# Patient Record
Sex: MALE | Race: WHITE | NOT HISPANIC OR LATINO
[De-identification: names, ages, dates, MRNs, and addresses within clinical notes are randomized per-mention and may not be internally consistent; named-entity substitution may affect disease eponyms.]

---

## 2024-05-23 ENCOUNTER — APPOINTMENT (OUTPATIENT)
Dept: PLASTIC SURGERY | Facility: CLINIC | Age: 64
End: 2024-05-23
Payer: COMMERCIAL

## 2024-05-23 VITALS
SYSTOLIC BLOOD PRESSURE: 146 MMHG | OXYGEN SATURATION: 99 % | RESPIRATION RATE: 22 BRPM | HEART RATE: 80 BPM | DIASTOLIC BLOOD PRESSURE: 102 MMHG

## 2024-05-23 DIAGNOSIS — T07.XXXA UNSPECIFIED MULTIPLE INJURIES, INITIAL ENCOUNTER: ICD-10-CM

## 2024-05-23 DIAGNOSIS — S01.81XA LACERATION W/OUT FOREIGN BODY OF OTHER PART OF HEAD, INITIAL ENCOUNTER: ICD-10-CM

## 2024-05-23 PROBLEM — Z00.00 ENCOUNTER FOR PREVENTIVE HEALTH EXAMINATION: Status: ACTIVE | Noted: 2024-05-23

## 2024-05-23 PROCEDURE — 12013 RPR F/E/E/N/L/M 2.6-5.0 CM: CPT

## 2024-05-23 NOTE — REASON FOR VISIT
[Procedure: _________] : a [unfilled] procedure visit [FreeTextEntry1] : 64-year-old man status post fall with forehead laceration  (seen in ER, CT scan< no other injury)

## 2024-05-23 NOTE — PROCEDURE
[FreeTextEntry1] : Laceration forehead and multiple facial abrasions  [FreeTextEntry2] : Repair forehead laceration and treatment of multiple facial abrasions [FreeTextEntry3] : Xylocaine with epinephrine  [FreeTextEntry4] : 10 cc [FreeTextEntry5] : none  [FreeTextEntry6] : Following a sterile prep and drape, Xylocaine with epinephrine was injected for local anesthesia.  Wound was cleaned and debrided, and the laceration repaired in deep and superficial layers.  Sterile dressing was placed, patient tolerated well.   [FreeTextEntry7] : none

## 2024-05-23 NOTE — ASSESSMENT
[FreeTextEntry1] : A: Forehead laceration with multiple facial abrasions P: Wounds washed irrigated and forehead laceration closed in layers.   Tolerated well, instructions reviewed.

## 2024-05-31 ENCOUNTER — APPOINTMENT (OUTPATIENT)
Dept: PLASTIC SURGERY | Facility: CLINIC | Age: 64
End: 2024-05-31
Payer: COMMERCIAL

## 2024-05-31 VITALS
HEART RATE: 65 BPM | DIASTOLIC BLOOD PRESSURE: 98 MMHG | TEMPERATURE: 97.9 F | SYSTOLIC BLOOD PRESSURE: 160 MMHG | OXYGEN SATURATION: 100 %

## 2024-05-31 PROCEDURE — 99024 POSTOP FOLLOW-UP VISIT: CPT

## 2024-05-31 RX ORDER — LOSARTAN POTASSIUM AND HYDROCHLOROTHIAZIDE 50; 12.5 MG/1; MG/1
50-12.5 TABLET, FILM COATED ORAL
Refills: 0 | Status: ACTIVE | COMMUNITY

## 2024-05-31 NOTE — REASON FOR VISIT
[Follow-Up: _____] : a [unfilled] follow-up visit [FreeTextEntry1] : Mr. ROBERTO CARLOS BRODERICK  returns for suture removal, generally doing well with no complaints and no fevers or chills at home.

## 2024-05-31 NOTE — PHYSICAL EXAM
[NI] : Normal [de-identified] : edema resolving abrasion present, improved wound is cleana dn well approximated with no erythema or purulence

## 2024-06-06 ENCOUNTER — APPOINTMENT (OUTPATIENT)
Dept: PLASTIC SURGERY | Facility: CLINIC | Age: 64
End: 2024-06-06
Payer: COMMERCIAL

## 2024-06-06 VITALS
TEMPERATURE: 97.9 F | RESPIRATION RATE: 20 BRPM | HEART RATE: 76 BPM | OXYGEN SATURATION: 97 % | SYSTOLIC BLOOD PRESSURE: 135 MMHG | DIASTOLIC BLOOD PRESSURE: 89 MMHG

## 2024-06-06 PROCEDURE — 99024 POSTOP FOLLOW-UP VISIT: CPT

## 2024-06-06 NOTE — REASON FOR VISIT
[Follow-Up: _____] : a [unfilled] follow-up visit [FreeTextEntry1] : Mr. ROBERTO CARLOS BRODERICK returns for a follow up visit, generally doing well with no complaints and no fevers or chills at home, improvement noted

## 2024-06-06 NOTE — PHYSICAL EXAM
[NI] : Normal [de-identified] : forehead laceration healing well, abrasion still with open area [de-identified] : nasal abrasion heling well as is the lip

## 2024-06-20 ENCOUNTER — APPOINTMENT (OUTPATIENT)
Dept: PLASTIC SURGERY | Facility: CLINIC | Age: 64
End: 2024-06-20
Payer: COMMERCIAL

## 2024-06-20 VITALS — HEART RATE: 64 BPM | DIASTOLIC BLOOD PRESSURE: 90 MMHG | SYSTOLIC BLOOD PRESSURE: 129 MMHG

## 2024-06-20 VITALS — HEART RATE: 71 BPM | DIASTOLIC BLOOD PRESSURE: 84 MMHG | SYSTOLIC BLOOD PRESSURE: 136 MMHG

## 2024-06-20 DIAGNOSIS — S01.81XS LACERATION W/OUT FOREIGN BODY OF OTHER PART OF HEAD, SEQUELA: ICD-10-CM

## 2024-06-20 DIAGNOSIS — D48.5 NEOPLASM OF UNCERTAIN BEHAVIOR OF SKIN: ICD-10-CM

## 2024-06-20 PROCEDURE — 99212 OFFICE O/P EST SF 10 MIN: CPT

## 2024-06-20 NOTE — REASON FOR VISIT
[Follow-Up: _____] : a [unfilled] follow-up visit [FreeTextEntry1] : Patient returns for follow up and to discuss a raised lesion on the nasal dorsum.

## 2024-06-20 NOTE — ASSESSMENT
[FreeTextEntry1] : A: Raised skin lesion nasal dorsum. Healing scar, forehead. P: Would recommend excisional biopsy nasal lesion. Reviewed the material, and alternatives with Mr. ROBERTO CARLOS BRODERICK, including no surgery, and he understands and wishes to proceed. Mederma on the forehead scar, follow in fall for laser therapy if indicated.

## 2024-07-16 ENCOUNTER — APPOINTMENT (OUTPATIENT)
Dept: PLASTIC SURGERY | Facility: CLINIC | Age: 64
End: 2024-07-16

## 2024-07-16 DIAGNOSIS — L91.8 OTHER HYPERTROPHIC DISORDERS OF THE SKIN: ICD-10-CM

## 2024-07-16 PROCEDURE — 99211 OFF/OP EST MAY X REQ PHY/QHP: CPT | Mod: NC

## 2024-07-17 PROBLEM — L91.8 SKIN TAG, ACQUIRED: Status: ACTIVE | Noted: 2024-07-17

## 2024-08-01 ENCOUNTER — APPOINTMENT (OUTPATIENT)
Dept: PLASTIC SURGERY | Facility: CLINIC | Age: 64
End: 2024-08-01

## 2024-08-01 DIAGNOSIS — S01.81XS LACERATION W/OUT FOREIGN BODY OF OTHER PART OF HEAD, SEQUELA: ICD-10-CM

## 2024-08-01 PROCEDURE — 99212 OFFICE O/P EST SF 10 MIN: CPT

## 2024-08-01 NOTE — REASON FOR VISIT
[Follow-Up: _____] : a [unfilled] follow-up visit [FreeTextEntry1] : Patient with good response to kenalog returns for second treatment

## 2024-08-01 NOTE — ASSESSMENT
[FreeTextEntry1] : A: Forehead scar following repair laceration P: Kenalog 5 mg injected. Tolerated well, instructions reviewed.

## 2024-08-22 ENCOUNTER — APPOINTMENT (OUTPATIENT)
Dept: PLASTIC SURGERY | Facility: CLINIC | Age: 64
End: 2024-08-22
Payer: COMMERCIAL

## 2024-08-22 DIAGNOSIS — S01.81XS LACERATION W/OUT FOREIGN BODY OF OTHER PART OF HEAD, SEQUELA: ICD-10-CM

## 2024-08-22 PROCEDURE — 99024 POSTOP FOLLOW-UP VISIT: CPT

## 2024-08-22 NOTE — ASSESSMENT
[FreeTextEntry1] : A: Forehead scar P: Scar massage  re-evaluate in three months for possible scar revision.

## 2024-08-22 NOTE — REASON FOR VISIT
[Follow-Up: _____] : a [unfilled] follow-up visit [FreeTextEntry1] : Mr. ROBERTO CARLOS BRODERICK returns for a follow up visit, generally doing well with no complaints and no fevers or chills at home. transverse scar red and raised

## 2024-11-20 ENCOUNTER — APPOINTMENT (OUTPATIENT)
Dept: PLASTIC SURGERY | Facility: CLINIC | Age: 64
End: 2024-11-20

## 2025-03-06 ENCOUNTER — APPOINTMENT (OUTPATIENT)
Dept: PLASTIC SURGERY | Facility: CLINIC | Age: 65
End: 2025-03-06
Payer: COMMERCIAL

## 2025-03-06 DIAGNOSIS — S01.81XS LACERATION W/OUT FOREIGN BODY OF OTHER PART OF HEAD, SEQUELA: ICD-10-CM

## 2025-03-06 PROCEDURE — 99212 OFFICE O/P EST SF 10 MIN: CPT

## 2025-03-19 ENCOUNTER — APPOINTMENT (OUTPATIENT)
Dept: PLASTIC SURGERY | Facility: CLINIC | Age: 65
End: 2025-03-19
Payer: COMMERCIAL

## 2025-03-19 DIAGNOSIS — L90.5 SCAR CONDITIONS AND FIBROSIS OF SKIN: ICD-10-CM

## 2025-03-19 DIAGNOSIS — S01.81XS LACERATION W/OUT FOREIGN BODY OF OTHER PART OF HEAD, SEQUELA: ICD-10-CM

## 2025-03-19 PROCEDURE — 17106 DSTR CUT VSC PRLF LES<10SQCM: CPT

## 2025-04-10 ENCOUNTER — APPOINTMENT (OUTPATIENT)
Dept: PLASTIC SURGERY | Facility: CLINIC | Age: 65
End: 2025-04-10
Payer: COMMERCIAL

## 2025-04-10 DIAGNOSIS — L90.5 SCAR CONDITIONS AND FIBROSIS OF SKIN: ICD-10-CM

## 2025-04-10 PROCEDURE — 17106 DSTR CUT VSC PRLF LES<10SQCM: CPT | Mod: 78

## 2025-05-07 ENCOUNTER — APPOINTMENT (OUTPATIENT)
Dept: PLASTIC SURGERY | Facility: CLINIC | Age: 65
End: 2025-05-07
Payer: COMMERCIAL

## 2025-05-07 DIAGNOSIS — L90.5 SCAR CONDITIONS AND FIBROSIS OF SKIN: ICD-10-CM

## 2025-05-07 PROCEDURE — 17106 DSTR CUT VSC PRLF LES<10SQCM: CPT | Mod: NC
